# Patient Record
Sex: FEMALE | Race: WHITE | URBAN - METROPOLITAN AREA
[De-identification: names, ages, dates, MRNs, and addresses within clinical notes are randomized per-mention and may not be internally consistent; named-entity substitution may affect disease eponyms.]

---

## 2021-08-18 ENCOUNTER — OFFICE VISIT (OUTPATIENT)
Dept: URBAN - METROPOLITAN AREA CLINIC 25 | Facility: CLINIC | Age: 66
End: 2021-08-18
Payer: COMMERCIAL

## 2021-08-18 DIAGNOSIS — S05.00XA CORNEAL ABRASION W/O FB OF EYE, INITIAL ENCOUNTER: Primary | ICD-10-CM

## 2021-08-18 PROCEDURE — 99203 OFFICE O/P NEW LOW 30 MIN: CPT | Performed by: OPTOMETRIST

## 2021-08-18 RX ORDER — NEOMYCIN SULFATE, POLYMYXIN B SULFATE AND DEXAMETHASONE 3.5; 10000; 1 MG/G; [USP'U]/G; MG/G
OINTMENT OPHTHALMIC
Qty: 3.5 | Refills: 1 | Status: ACTIVE
Start: 2021-08-18

## 2021-08-18 NOTE — IMPRESSION/PLAN
Impression: Corneal abrasion w/o FB of eye, initial encounter: S05.00XA. Plan: Begin Maxitrol TID OD x 1 week. Discussed possible SE's of long term steroid use, including IOP spike. RTC 1 week x follow up.

## 2021-08-26 ENCOUNTER — OFFICE VISIT (OUTPATIENT)
Dept: URBAN - METROPOLITAN AREA CLINIC 16 | Facility: CLINIC | Age: 66
End: 2021-08-26
Payer: COMMERCIAL

## 2021-08-26 DIAGNOSIS — S05.02XD CORNEAL ABRASION W/O FB OF LEFT EYE, SUBSEQUENT ENCOUNTER: Primary | ICD-10-CM

## 2021-08-26 PROCEDURE — 99212 OFFICE O/P EST SF 10 MIN: CPT | Performed by: OPTOMETRIST

## 2021-08-26 ASSESSMENT — INTRAOCULAR PRESSURE
OD: 20
OS: 20

## 2021-08-26 NOTE — IMPRESSION/PLAN
Impression: Corneal abrasion w/o FB of left eye, subsequent encounter: S05. [id number]. Plan: Condition improved. Use AT's prn x lubrication. RTC prn.